# Patient Record
Sex: FEMALE | Race: BLACK OR AFRICAN AMERICAN | NOT HISPANIC OR LATINO | URBAN - METROPOLITAN AREA
[De-identification: names, ages, dates, MRNs, and addresses within clinical notes are randomized per-mention and may not be internally consistent; named-entity substitution may affect disease eponyms.]

---

## 2023-10-25 ENCOUNTER — HOSPITAL ENCOUNTER (EMERGENCY)
Facility: HOSPITAL | Age: 48
Discharge: HOME | End: 2023-10-25
Attending: EMERGENCY MEDICINE
Payer: COMMERCIAL

## 2023-10-25 ENCOUNTER — APPOINTMENT (OUTPATIENT)
Dept: RADIOLOGY | Facility: HOSPITAL | Age: 48
End: 2023-10-25
Payer: COMMERCIAL

## 2023-10-25 VITALS
WEIGHT: 204 LBS | HEIGHT: 62 IN | RESPIRATION RATE: 18 BRPM | SYSTOLIC BLOOD PRESSURE: 134 MMHG | DIASTOLIC BLOOD PRESSURE: 83 MMHG | TEMPERATURE: 98.4 F | OXYGEN SATURATION: 100 % | HEART RATE: 71 BPM | BODY MASS INDEX: 37.54 KG/M2

## 2023-10-25 DIAGNOSIS — E11.9 TYPE 2 DIABETES MELLITUS WITHOUT COMPLICATION, WITHOUT LONG-TERM CURRENT USE OF INSULIN (MULTI): Primary | ICD-10-CM

## 2023-10-25 DIAGNOSIS — R42 LIGHTHEADEDNESS: ICD-10-CM

## 2023-10-25 PROBLEM — J34.3 NASAL TURBINATE HYPERTROPHY: Status: ACTIVE | Noted: 2023-10-25

## 2023-10-25 PROBLEM — E66.812 OBESITY, CLASS II, BMI 35-39.9: Status: ACTIVE | Noted: 2022-05-23

## 2023-10-25 PROBLEM — R09.81 CHRONIC NASAL CONGESTION: Status: ACTIVE | Noted: 2023-10-25

## 2023-10-25 PROBLEM — J35.8 TONSIL STONE: Status: ACTIVE | Noted: 2023-10-25

## 2023-10-25 PROBLEM — E66.9 OBESITY, CLASS II, BMI 35-39.9: Status: ACTIVE | Noted: 2022-05-23

## 2023-10-25 PROBLEM — R00.0 TACHYCARDIA: Status: ACTIVE | Noted: 2022-05-23

## 2023-10-25 PROBLEM — K21.9 ACID REFLUX: Status: ACTIVE | Noted: 2023-10-25

## 2023-10-25 LAB
ALBUMIN SERPL-MCNC: 4.4 G/DL (ref 3.5–5)
ALP BLD-CCNC: 148 U/L (ref 35–125)
ALT SERPL-CCNC: 25 U/L (ref 5–40)
AMPHETAMINES UR QL SCN>1000 NG/ML: NEGATIVE
ANION GAP SERPL CALC-SCNC: 11 MMOL/L
APPEARANCE UR: CLEAR
AST SERPL-CCNC: 18 U/L (ref 5–40)
B-OH-BUTYR+ACETOACET BLD-SCNC: 0.1 MMOL/L (ref 0.1–0.3)
BACTERIA #/AREA URNS AUTO: ABNORMAL /HPF
BARBITURATES UR QL SCN>300 NG/ML: NEGATIVE
BASE EXCESS BLDV CALC-SCNC: -6.3 MMOL/L (ref -2–3)
BASOPHILS # BLD AUTO: 0.05 X10*3/UL (ref 0–0.1)
BASOPHILS NFR BLD AUTO: 0.5 %
BENZODIAZ UR QL SCN>300 NG/ML: NEGATIVE
BILIRUB SERPL-MCNC: 0.3 MG/DL (ref 0.1–1.2)
BILIRUB UR STRIP.AUTO-MCNC: NEGATIVE MG/DL
BODY TEMPERATURE: 37 DEGREES CELSIUS
BUN SERPL-MCNC: 10 MG/DL (ref 8–25)
BZE UR QL SCN>300 NG/ML: NEGATIVE
CALCIUM SERPL-MCNC: 9.2 MG/DL (ref 8.5–10.4)
CANNABINOIDS UR QL SCN>50 NG/ML: NEGATIVE
CHLORIDE SERPL-SCNC: 100 MMOL/L (ref 97–107)
CO2 SERPL-SCNC: 23 MMOL/L (ref 24–31)
COLOR UR: COLORLESS
CREAT SERPL-MCNC: 0.8 MG/DL (ref 0.4–1.6)
EOSINOPHIL # BLD AUTO: 0.22 X10*3/UL (ref 0–0.7)
EOSINOPHIL NFR BLD AUTO: 2.2 %
ERYTHROCYTE [DISTWIDTH] IN BLOOD BY AUTOMATED COUNT: 12.2 % (ref 11.5–14.5)
ETHANOL SERPL-MCNC: <0.01 G/DL
FENTANYL+NORFENTANYL UR QL SCN: NEGATIVE
GFR SERPL CREATININE-BSD FRML MDRD: 90 ML/MIN/1.73M*2
GLUCOSE BLD MANUAL STRIP-MCNC: 254 MG/DL (ref 74–99)
GLUCOSE BLD MANUAL STRIP-MCNC: 375 MG/DL (ref 74–99)
GLUCOSE SERPL-MCNC: 382 MG/DL (ref 65–99)
GLUCOSE UR STRIP.AUTO-MCNC: ABNORMAL MG/DL
HCG UR QL IA.RAPID: NEGATIVE
HCO3 BLDV-SCNC: 19.7 MMOL/L (ref 22–26)
HCT VFR BLD AUTO: 39.5 % (ref 36–46)
HGB BLD-MCNC: 13 G/DL (ref 12–16)
IMM GRANULOCYTES # BLD AUTO: 0.03 X10*3/UL (ref 0–0.7)
IMM GRANULOCYTES NFR BLD AUTO: 0.3 % (ref 0–0.9)
INHALED O2 CONCENTRATION: 0 %
KETONES UR STRIP.AUTO-MCNC: NEGATIVE MG/DL
LEUKOCYTE ESTERASE UR QL STRIP.AUTO: NEGATIVE
LYMPHOCYTES # BLD AUTO: 3.85 X10*3/UL (ref 1.2–4.8)
LYMPHOCYTES NFR BLD AUTO: 38.4 %
MAGNESIUM SERPL-MCNC: 2 MG/DL (ref 1.6–3.1)
MCH RBC QN AUTO: 29.9 PG (ref 26–34)
MCHC RBC AUTO-ENTMCNC: 32.9 G/DL (ref 32–36)
MCV RBC AUTO: 91 FL (ref 80–100)
METHADONE UR QL SCN>300 NG/ML: NEGATIVE
MONOCYTES # BLD AUTO: 0.85 X10*3/UL (ref 0.1–1)
MONOCYTES NFR BLD AUTO: 8.5 %
NEUTROPHILS # BLD AUTO: 5.03 X10*3/UL (ref 1.2–7.7)
NEUTROPHILS NFR BLD AUTO: 50.1 %
NITRITE UR QL STRIP.AUTO: NEGATIVE
NRBC BLD-RTO: 0 /100 WBCS (ref 0–0)
OPIATES UR QL SCN>300 NG/ML: NEGATIVE
OXYCODONE UR QL: NEGATIVE
OXYHGB MFR BLDV: 47.7 % (ref 45–75)
PCO2 BLDV: 40 MM HG (ref 41–51)
PCP UR QL SCN>25 NG/ML: NEGATIVE
PH BLDV: 7.3 PH (ref 7.33–7.43)
PH UR STRIP.AUTO: 5 [PH]
PLATELET # BLD AUTO: 330 X10*3/UL (ref 150–450)
PMV BLD AUTO: 9.7 FL (ref 7.5–11.5)
PO2 BLDV: 31 MM HG (ref 35–45)
POTASSIUM SERPL-SCNC: 4.3 MMOL/L (ref 3.4–5.1)
PROT SERPL-MCNC: 7.1 G/DL (ref 5.9–7.9)
PROT UR STRIP.AUTO-MCNC: NEGATIVE MG/DL
RBC # BLD AUTO: 4.35 X10*6/UL (ref 4–5.2)
RBC # UR STRIP.AUTO: ABNORMAL /UL
RBC #/AREA URNS AUTO: >20 /HPF
SAO2 % BLDV: 48 % (ref 45–75)
SARS-COV-2 RNA RESP QL NAA+PROBE: NOT DETECTED
SODIUM SERPL-SCNC: 134 MMOL/L (ref 133–145)
SP GR UR STRIP.AUTO: 1.02
TEST COMMENT: ABNORMAL
TROPONIN T SERPL-MCNC: <6 NG/L
TROPONIN T SERPL-MCNC: <6 NG/L
UROBILINOGEN UR STRIP.AUTO-MCNC: NORMAL MG/DL
WBC # BLD AUTO: 10 X10*3/UL (ref 4.4–11.3)
WBC #/AREA URNS AUTO: ABNORMAL /HPF

## 2023-10-25 PROCEDURE — 82077 ASSAY SPEC XCP UR&BREATH IA: CPT | Performed by: EMERGENCY MEDICINE

## 2023-10-25 PROCEDURE — 36415 COLL VENOUS BLD VENIPUNCTURE: CPT | Performed by: EMERGENCY MEDICINE

## 2023-10-25 PROCEDURE — 82805 BLOOD GASES W/O2 SATURATION: CPT | Performed by: EMERGENCY MEDICINE

## 2023-10-25 PROCEDURE — 2500000004 HC RX 250 GENERAL PHARMACY W/ HCPCS (ALT 636 FOR OP/ED): Performed by: EMERGENCY MEDICINE

## 2023-10-25 PROCEDURE — 80307 DRUG TEST PRSMV CHEM ANLYZR: CPT | Performed by: EMERGENCY MEDICINE

## 2023-10-25 PROCEDURE — 81001 URINALYSIS AUTO W/SCOPE: CPT | Performed by: EMERGENCY MEDICINE

## 2023-10-25 PROCEDURE — 80053 COMPREHEN METABOLIC PANEL: CPT | Performed by: EMERGENCY MEDICINE

## 2023-10-25 PROCEDURE — 71045 X-RAY EXAM CHEST 1 VIEW: CPT | Mod: FY

## 2023-10-25 PROCEDURE — 99284 EMERGENCY DEPT VISIT MOD MDM: CPT | Performed by: EMERGENCY MEDICINE

## 2023-10-25 PROCEDURE — 84484 ASSAY OF TROPONIN QUANT: CPT | Performed by: EMERGENCY MEDICINE

## 2023-10-25 PROCEDURE — 83735 ASSAY OF MAGNESIUM: CPT | Performed by: EMERGENCY MEDICINE

## 2023-10-25 PROCEDURE — 87635 SARS-COV-2 COVID-19 AMP PRB: CPT | Performed by: EMERGENCY MEDICINE

## 2023-10-25 PROCEDURE — 70450 CT HEAD/BRAIN W/O DYE: CPT

## 2023-10-25 PROCEDURE — 99285 EMERGENCY DEPT VISIT HI MDM: CPT | Mod: 25 | Performed by: EMERGENCY MEDICINE

## 2023-10-25 PROCEDURE — 81025 URINE PREGNANCY TEST: CPT | Performed by: EMERGENCY MEDICINE

## 2023-10-25 PROCEDURE — 82010 KETONE BODYS QUAN: CPT | Performed by: EMERGENCY MEDICINE

## 2023-10-25 PROCEDURE — 85025 COMPLETE CBC W/AUTO DIFF WBC: CPT | Performed by: EMERGENCY MEDICINE

## 2023-10-25 PROCEDURE — 82947 ASSAY GLUCOSE BLOOD QUANT: CPT | Mod: 59

## 2023-10-25 PROCEDURE — 82947 ASSAY GLUCOSE BLOOD QUANT: CPT

## 2023-10-25 RX ORDER — METFORMIN HYDROCHLORIDE 500 MG/1
500 TABLET ORAL
Qty: 30 TABLET | Refills: 0 | Status: SHIPPED | OUTPATIENT
Start: 2023-10-25 | End: 2023-11-09

## 2023-10-25 RX ORDER — LORATADINE 10 MG/1
TABLET ORAL
COMMUNITY

## 2023-10-25 RX ORDER — PANTOPRAZOLE SODIUM 40 MG/1
40 TABLET, DELAYED RELEASE ORAL DAILY
COMMUNITY
Start: 2020-02-05

## 2023-10-25 RX ORDER — ACETAMINOPHEN 325 MG/1
650 TABLET ORAL ONCE
Status: DISCONTINUED | OUTPATIENT
Start: 2023-10-25 | End: 2023-10-26 | Stop reason: HOSPADM

## 2023-10-25 RX ADMIN — SODIUM CHLORIDE 1000 ML: 900 INJECTION, SOLUTION INTRAVENOUS at 19:12

## 2023-10-25 ASSESSMENT — PAIN - FUNCTIONAL ASSESSMENT: PAIN_FUNCTIONAL_ASSESSMENT: 0-10

## 2023-10-25 ASSESSMENT — LIFESTYLE VARIABLES
HAVE YOU EVER FELT YOU SHOULD CUT DOWN ON YOUR DRINKING: NO
REASON UNABLE TO ASSESS: NO
EVER FELT BAD OR GUILTY ABOUT YOUR DRINKING: NO
HAVE PEOPLE ANNOYED YOU BY CRITICIZING YOUR DRINKING: NO
EVER HAD A DRINK FIRST THING IN THE MORNING TO STEADY YOUR NERVES TO GET RID OF A HANGOVER: NO

## 2023-10-25 ASSESSMENT — COLUMBIA-SUICIDE SEVERITY RATING SCALE - C-SSRS
2. HAVE YOU ACTUALLY HAD ANY THOUGHTS OF KILLING YOURSELF?: NO
6. HAVE YOU EVER DONE ANYTHING, STARTED TO DO ANYTHING, OR PREPARED TO DO ANYTHING TO END YOUR LIFE?: NO
1. IN THE PAST MONTH, HAVE YOU WISHED YOU WERE DEAD OR WISHED YOU COULD GO TO SLEEP AND NOT WAKE UP?: NO

## 2023-10-25 ASSESSMENT — PAIN SCALES - GENERAL
PAINLEVEL_OUTOF10: 0 - NO PAIN

## 2023-10-25 NOTE — Clinical Note
Tracie Green was seen and treated in our emergency department on 10/25/2023.  She may return to work on 10/27/2023.       If you have any questions or concerns, please don't hesitate to call.      Lorenza Tarango MD

## 2023-10-25 NOTE — ED TRIAGE NOTES
Pt had sudden onset weakness and dizziness. Pt did not lose consciousness but was difficult to arouse per coworkers. Pt lethargic and weak upon ems arrival. Pt has hx type 2 diabetes.

## 2023-10-25 NOTE — ED PROVIDER NOTES
HPI   Chief Complaint   Patient presents with    Weakness, Gen    Fatigue       This is a 48-year-old female who presents emergency department because she felt lightheaded at work.  Patient states she fell like she was going to fall over.  Patient denies feeling like the room was spinning.  Patient was taken from work to the ER via ambulance.  Patient denies any past medical history.  Patient states she takes no daily medications.  Patient states that she does not have any chest pain, belly pain, or back pain.  Patient denies any recent illnesses.  Patient states she just feels weak right now.  Patient states the lightheadedness resolved.  Patient denies difficulty with speech.  Patient denies changes in her vision, or numbness or tingling in her extremities.           Please see HPI for pertinent positive and negative ROS. The remaining 10 point ROS negative.                  [place] Coma Scale Score: 15         NIH Stroke Scale: 0          Patient History   Past Medical History:   Diagnosis Date    Personal history of other specified conditions     History of heartburn     No past surgical history on file.  No family history on file.  Social History     Tobacco Use    Smoking status: Not on file    Smokeless tobacco: Not on file   Substance Use Topics    Alcohol use: Not on file    Drug use: Not on file       Physical Exam   ED Triage Vitals [10/25/23 1806]   Temp Heart Rate Resp BP   36.9 °C (98.4 °F) 86 16 151/83      SpO2 Temp src Heart Rate Source Patient Position   100 % -- Monitor --      BP Location FiO2 (%)     -- --       Physical Exam  GENERAL APPEARANCE: Awake and alert. No acute distress.   VITAL SIGNS: As per the nurses' triage record.  HEENT: Normocephalic, atraumatic. Extraocular muscles are intact. Conjunctiva are pink. Negative scleral icterus. Mucous membranes are moist. Tongue in the midline. Oropharynx clear, uvula midline.   NECK: Soft, nontender and supple, full gross ROM, no meningeal  "signs.  CHEST: Nontender to palpation. Clear to auscultation bilaterally. No rales, rhonchi, or wheezing. Symmetric rise and fall of chest wall.   HEART: Clear S1 and S2. Regular rate and rhythm. No murmurs appreciated on auscultation.  Strong and equal pulses in the extremities.  ABDOMEN: Soft, nontender, nondistended, positive bowel sounds, no palpable masses.  MUSCULOSKELETAL: The calves are nontender to palpation. Full gross active range of motion. Ambulating on own with no acute difficulties  NEUROLOGICAL: Awake, alert and oriented x 3. Motor power intact in the upper and lower extremities. Sensation is intact to light touch in the upper and lower extremities. Patient answering questions appropriately.  Cranial nerves II through XII grossly intact bilaterally.    IMMUNOLOGICAL: No lymphatic streaking noted  DERMATOLOGIC: Warm and dry without petechiae, rashes, or ecchymosis noted on visible skin.   PYSCH: Cooperative with appropriate mood and affect.  ED Course & MDM   ED Course as of 10/25/23 2227   Wed Oct 25, 2023   2217 Patient's repeat blood glucose via accucheck 250.  Patient did a trial and ambulation was able to walk well.  Patient does have an appoint with her primary care doctor tomorrow to check her \"A1c.\"  Patient told me this at bedside. [SC]      ED Course User Index  [SC] Chaya Nicholas PA-C         Diagnoses as of 10/25/23 2227   Type 2 diabetes mellitus without complication, without long-term current use of insulin (CMS/[facility])   Lightheadedness       Medical Decision Making  Parts of this chart have been completed using voice recognition software. Please excuse any errors of transcription.  My thought process and reason for plan has been formulated from the time that I saw the patient until the time of disposition and is not specific to one specific moment during their visit and furthermore my MDM encompasses this entire chart and not only this text box.      HPI: Detailed above.    Exam: A " medically appropriate exam performed, outlined above, given the known history and presentation.    History obtained from: Patient    EKG: See my supervising physicians EKG interpretation    Social Determinants of Health considered during this visit: Lives at home    Medications given during visit:  Medications   acetaminophen (Tylenol) tablet 650 mg (650 mg oral Not Given 10/25/23 1850)   sodium chloride 0.9 % bolus 1,000 mL (0 mL intravenous Stopped 10/25/23 1942)        Diagnostic/tests  Labs Reviewed   COMPREHENSIVE METABOLIC PANEL - Abnormal       Result Value    Glucose 382 (*)     Sodium 134      Potassium 4.3      Chloride 100      Bicarbonate 23 (*)     Urea Nitrogen 10      Creatinine 0.80      eGFR 90      Calcium 9.2      Albumin 4.4      Alkaline Phosphatase 148 (*)     Total Protein 7.1      AST 18      Bilirubin, Total 0.3      ALT 25      Anion Gap 11     URINALYSIS WITH REFLEX MICROSCOPIC - Abnormal    Color, Urine Colorless (*)     Appearance, Urine Clear      Specific Gravity, Urine 1.016      pH, Urine 5.0      Protein, Urine NEGATIVE      Glucose, Urine OVER (4+) (*)     Blood, Urine OVER (3+) (*)     Ketones, Urine NEGATIVE      Bilirubin, Urine NEGATIVE      Urobilinogen, Urine Normal      Nitrite, Urine NEGATIVE      Leukocyte Esterase, Urine NEGATIVE     BLOOD GAS VENOUS - Abnormal    POCT pH, Venous 7.30 (*)     POCT pCO2, Venous 40 (*)     POCT pO2, Venous 31 (*)     POCT SO2, Venous 48      POCT Oxy Hemoglobin, Venous 47.7      POCT Base Excess, Venous -6.3 (*)     POCT HCO3 Calculated, Venous 19.7 (*)     Patient Temperature 37.0      FiO2 0      Test Comment [id number]     MICROSCOPIC ONLY, URINE - Abnormal    WBC, Urine 1-5      RBC, Urine >20 (*)     Bacteria, Urine 1+ (*)    POCT GLUCOSE - Abnormal    POCT Glucose 375 (*)    POCT GLUCOSE - Abnormal    POCT Glucose 254 (*)    MAGNESIUM - Normal    Magnesium 2.00     SERIAL TROPONIN, INITIAL ([facility]) - Normal    Troponin T, High Sensitivity  <6     SERIAL TROPONIN,  2 HOUR ([facility]) - Normal    Troponin T, High Sensitivity <6     HCG, URINE, QUALITATIVE - Normal    HCG, Urine NEGATIVE     DRUG SCREEN,URINE - Normal    Amphetamine Screen, Urine Negative      Barbiturate Screen, Urine Negative      Benzodiazepines Screen, Urine Negative      Cannabinoid Screen, Urine Negative      Cocaine Metabolite Screen, Urine Negative      Fentanyl Screen, Urine Negative      Methadone Screen, Urine Negative      Opiate Screen, Urine Negative      Oxycodone Screen, Urine Negative      PCP Screen, Urine Negative      Narrative:     These toxicological screening tests provide unconfirmed qualitative measurements to aid in treatment and diagnosis in cases of drug use or overdose. This test is used only for medical purposes. A positive result does not indicate or measure intoxication. For specific test performance or pathologist consultation, please contact the Laboratory.    The following threshold concentrations are used for these analyses.Values at or above the threshold concentration are reported as positive. Values below the threshold are reported as negative.    Drug /Screening Threshold                                                                                                 THC/CANNABINOIDS................50 ng/ml  METHADONE......................300 ng/ml  COCAINE METABOLITES............300 ng/ml  BENZODIAZEPINE.................300 ng/ml  PCP.............................25 ng/ml  OPIATE.........................300 ng/ml  AMPHETAMINE/ECSTASY...........1000 ng/ml  BARBITURATE....................200 ng/ml  OXYCODONE......................100 ng/ml  FENTANYL.........................5 ng/ml       ALCOHOL - Normal    Alcohol <0.010     SARS-COV-2 PCR, SYMPTOMATIC - Normal    Coronavirus 2019, PCR Not Detected      Narrative:     This assay has received FDA Emergency Use Authorization (EUA) and is only authorized for the duration of time that circumstances exist to  justify the authorization of the emergency use of in vitro diagnostic tests for the detection of SARS-CoV-2 virus and/or diagnosis of COVID-19 infection under section 564(b)(1) of the Act, 21 U.S.C. 360bbb-3(b)(1). This assay is an in vitro diagnostic nucleic acid amplification test for the qualitative detection of SARS-CoV-2 from nasopharyngeal specimens and has been validated for use at [facility]. Negative results do not preclude COVID-19 infections and should not be used as the sole basis for diagnosis, treatment, or other management decisions.     BETA HYDROXYBUTYRATE - Normal    Beta-Hydroxybutyrate 0.10      Narrative:     Values exceeding 0.27 mmol/L indicate ketosis.   CBC WITH AUTO DIFFERENTIAL    WBC 10.0      nRBC 0.0      RBC 4.35      Hemoglobin 13.0      Hematocrit 39.5      MCV 91      MCH 29.9      MCHC 32.9      RDW 12.2      Platelets 330      MPV 9.7      Neutrophils % 50.1      Immature Granulocytes %, Automated 0.3      Lymphocytes % 38.4      Monocytes % 8.5      Eosinophils % 2.2      Basophils % 0.5      Neutrophils Absolute 5.03      Immature Granulocytes Absolute, Automated 0.03      Lymphocytes Absolute 3.85      Monocytes Absolute 0.85      Eosinophils Absolute 0.22      Basophils Absolute 0.05     TROPONIN T SERIES, HIGH SENSITIVITY (0, 2 HR, 6 HR)    Narrative:     The following orders were created for panel order Troponin T Series, High Sensitivity (0, 2HR, 6HR).  Procedure                               Abnormality         Status                     ---------                               -----------         ------                     Serial Troponin, Initial...[[id number]]  Normal              Final result               Serial Troponin, 2 Hour ...[[id number]]  Normal              Final result               Serial Troponin, 6 Hour ...[[id number]]                                                   Please view results for these tests on the individual orders.    SERIAL TROPONIN, 6 HOUR ([facility])   POCT FINGERSTICK GLUCOSE   POCT GLUCOSE METER      XR chest 1 view   Final Result   Normal portable chest.        Signed by: Brooklynn Edwards 10/25/2023 7:41 PM   Dictation workstation:   [id number]      CT head wo IV contrast   Final Result   Normal unenhanced CT brain.        Signed by: Brooklynn Edwards 10/25/2023 7:20 PM   Dictation workstation:   [id number]           Considerations/further MDM:  Patient was seen in conjucntion with my supervising physician,  Dr. Tarango. Please refer to her note.    I estimate there is LOW risk for ACUTE CORONARY SYNDROME, MALIGNANT DYSRHYTHMIA, PNEUMOTHORAX, PNEUMONIA, PULMONARY EMBOLISM, AORTIC DISSECTION, SEPSIS, SUBARACHNOID HEMORRHAGE, SUBDURAL HEMATOMA, INTRACRANIAL HEMORRHAGE, STROKE, MENINGITIS, URINARY TRACT INFECTION, DKA, HHS, or ELECTROLYTE DISORDER, thus I consider the discharge disposition reasonable.  Laboratory evaluation shows the patient does have diabetes.  She is not in DKA or HHS currently based on laboratory evaluation.  Patient was educated that she must follow-up with her primary care doctor to start controlling her diabetes.  We will send her prescription of metformin 500 mg p.o. twice daily.   We have discussed the diagnosis and risks, and we agree with discharging home to follow-up with their primary doctor or specialist as directed in your discharge instructions. We also discussed returning to the Emergency Department immediately if new or worsening symptoms occur. We have discussed the symptoms which are most concerning (e.g. weakness, visual disturbances, difficult with speech, shortness of breath, vomiting, fever) that necessitate immediate return.    Verbalized understanding to discharge instructions.  Patient was discharged in stable condition.  Procedure  Procedures     Chaya Nicholas PA-C  10/25/23 1322

## 2023-10-25 NOTE — PROGRESS NOTES
Attestation note/supervisory note for USMAN Nicholas      The patient is a 48-year-old female presenting to the emergency department by EMS from work.  The patient reportedly started having some lightheadedness and just did not feel well shortly prior to coming to the emergency room.  She states that she just has been feeling very fatigued and having some generalized malaise.  She states that she started feeling confused about an hour or 2 prior to arrival.  She states that coworkers called EMS.  She denies any headache or visual changes this time but she did have a mild headache earlier in the day.  No focal weakness or numbness.  No chest pain or shortness of breath.  No abdominal pain.  No nausea or vomiting.  No diarrhea or constipation but no urinary complaints.  All pertinent positives and negatives are recorded above.  All other systems reviewed and otherwise negative.  Vital signs with mild hypertension but otherwise within normal limits.  Sickle exam with a well-nourished well-developed female with obesity but no evidence of acute distress.  HEENT exam within normal limits.  She has no evidence of airway compromise or respiratory distress.  Abdominal exam is benign.  She has no gross motor, neurologic or vascular deficits on exam.      EKG with sinus rhythm at 80 bpm, normal axis, normal voltage, normal ST segment, normal T waves      IV fluids and oral acetaminophen ordered.      Diagnostic labs with glycosuria, hematuria, hyperglycemia but no evidence of DKA, but otherwise unremarkable.      Initial Troponin T <6.  Repeat trop T <6. Delta trop t 0      COVID-19 testing negative      Chest x-ray  IMPRESSION:  Normal portable chest.      CT head  IMPRESSION:  Normal unenhanced CT brain.      Repeat Accu-Chek was 250.      The patient reportedly does have an appoint with her primary care physician tomorrow to have her hemoglobin A1c checked as they are following her for possible diabetes.  She does not have  any evidence of overt DKA on diagnostic labs.  She does not have any acute process on CT imaging.  No evidence of mass effect, no cranial hemorrhage or other acute process.  No evidence of pneumonia or pneumothorax on chest x-ray.  The patient does not have any neurologic deficits on exam.  She was able to complete a trial of ambulation in the emergency room prior to release.  She states that she did feel better.      The patient was released in good condition.  She was instructed to follow-up with her primary care physician tomorrow as previously scheduled for further management of her diabetes.  She will return to the emergency department sooner with worsening of symptoms or onset of new symptoms.  Rx given for metformin.        I personally saw the patient and performed a substantive portion of the visit including all aspects of the medical decision making.      I reviewed the results of the diagnostic labs and diagnostic imaging.  Formal radiology reading was completed by the radiologist    Lorenza Tarango MD

## 2023-10-26 ENCOUNTER — HOSPITAL ENCOUNTER (OUTPATIENT)
Dept: CARDIOLOGY | Facility: HOSPITAL | Age: 48
Discharge: HOME | End: 2023-10-26
Payer: COMMERCIAL

## 2023-10-26 LAB
ATRIAL RATE: 80 BPM
P AXIS: 74 DEGREES
P OFFSET: 200 MS
P ONSET: 152 MS
PR INTERVAL: 128 MS
Q ONSET: 216 MS
QRS COUNT: 14 BEATS
QRS DURATION: 90 MS
QT INTERVAL: 390 MS
QTC CALCULATION(BAZETT): 449 MS
QTC FREDERICIA: 429 MS
R AXIS: 66 DEGREES
T AXIS: 73 DEGREES
T OFFSET: 411 MS
VENTRICULAR RATE: 80 BPM

## 2023-10-26 PROCEDURE — 93005 ELECTROCARDIOGRAM TRACING: CPT

## 2023-10-26 NOTE — DISCHARGE INSTRUCTIONS
Follow-up with your primary care physician tomorrow as previously scheduled.      Return to the emergency department sooner with worsening of symptoms or onset of new symptoms

## 2024-06-28 ENCOUNTER — HOSPITAL ENCOUNTER (OUTPATIENT)
Dept: RADIOLOGY | Facility: HOSPITAL | Age: 49
Discharge: HOME | End: 2024-06-28
Payer: COMMERCIAL

## 2024-06-28 VITALS — WEIGHT: 208 LBS | HEIGHT: 62 IN | BODY MASS INDEX: 38.28 KG/M2

## 2024-06-28 DIAGNOSIS — Z12.31 SCREENING MAMMOGRAM FOR BREAST CANCER: ICD-10-CM

## 2024-06-28 PROCEDURE — 77067 SCR MAMMO BI INCL CAD: CPT

## 2024-10-02 ENCOUNTER — APPOINTMENT (OUTPATIENT)
Dept: GASTROENTEROLOGY | Facility: CLINIC | Age: 49
End: 2024-10-02
Payer: COMMERCIAL

## 2024-10-02 VITALS — HEIGHT: 62 IN | OXYGEN SATURATION: 98 % | BODY MASS INDEX: 39.42 KG/M2 | WEIGHT: 214.2 LBS | HEART RATE: 96 BPM

## 2024-10-02 DIAGNOSIS — R12 HEARTBURN: ICD-10-CM

## 2024-10-02 DIAGNOSIS — Z12.11 COLON CANCER SCREENING: ICD-10-CM

## 2024-10-02 DIAGNOSIS — K59.01 SLOW TRANSIT CONSTIPATION: Primary | ICD-10-CM

## 2024-10-02 DIAGNOSIS — K21.9 GASTROESOPHAGEAL REFLUX DISEASE WITHOUT ESOPHAGITIS: ICD-10-CM

## 2024-10-02 PROCEDURE — 99204 OFFICE O/P NEW MOD 45 MIN: CPT | Performed by: INTERNAL MEDICINE

## 2024-10-02 PROCEDURE — 1036F TOBACCO NON-USER: CPT | Performed by: INTERNAL MEDICINE

## 2024-10-02 PROCEDURE — 3008F BODY MASS INDEX DOCD: CPT | Performed by: INTERNAL MEDICINE

## 2024-10-02 RX ORDER — GLIMEPIRIDE 1 MG/1
1 TABLET ORAL
COMMUNITY

## 2024-10-02 RX ORDER — SEMAGLUTIDE 0.68 MG/ML
INJECTION, SOLUTION SUBCUTANEOUS
COMMUNITY
End: 2024-10-02 | Stop reason: ALTCHOICE

## 2024-10-02 NOTE — PROGRESS NOTES
Primary Care Provider: No primary care provider on file.  Referring Provider: No ref. provider found  My final recommendations will be communicated back to the referring physician and/or primary care provider via shared medical records or via US mail.    Chief Complaint (Reason for visit):   Tracie Green is a 49 y.o. female who presents for chronic constipation    ASSESSMENT AND PLAN     Assessment & Plan  Slow transit constipation  Patient's constipation is likely multifactorial from IBS, diabetic autonomic neuropathy with superimposed GLP-1 analog use    - I will start the patient on Linzess and assess response  -Colonoscopy for screening  Orders:    linaCLOtide (Linzess) 72 mcg capsule; Take 1 capsule (72 mcg) by mouth once daily in the morning. Take before meals. Do not crush or chew.    Follow Up In Gastroenterology; Future    Colon cancer screening    Orders:    Colonoscopy Diagnostic; Future    Heartburn  Patient has several risk factors for heartburn.  She also has halitosis.  This is despite taking pantoprazole 40 mg once a day    Differential diagnoses include gastroparesis, HH, GERD    -Recommend an EGD with biopsies  -After EGD will likely change her PPI to Nexium 40 mg twice a day. I may try Reglan or Motegrity.  Orders:    Follow Up In Gastroenterology; Future    Esophagogastroduodenoscopy (EGD); Future    Gastroesophageal reflux disease without esophagitis  Patient has several risk factors for heartburn.  She also has halitosis.  This is despite taking pantoprazole 40 mg once a day    Differential diagnoses include gastroparesis, HH, GERD    -Recommend an EGD with biopsies  -After EGD will likely change her PPI to Nexium 40 mg twice a day. I may try Reglan or Motegrity.    Orders:    Esophagogastroduodenoscopy (EGD); Future      I discussed the risks, benefits and alternative(s) of the procedure(s) with the patient. Pt verbalizes understanding and is willing to proceed. All questions were  "answered.    Kamila Coley MD, MS  10/2/2024    SUBJECTIVE   HPI: History obtained from patient    Patient comes to see me for several complaints    A) constipation  At baseline she has 1 BM per week  She takes fiber supplements every day which partially helps - has 2-3 BM per week  She has been this way even before she was diagnosed with diabetes or she was on Ozempic (she was diagnosed with diabetes about 8 months ago)    B) she also complains of heartburn and bad breath  She has been to the dentist and dental pathology has been ruled out  She had an EGD several years ago and was told that she has hiatal hernia.  She is on Protonix 40 mg/day which partially helps    RF  + on Ozempic   + DM  + HH    REDFLAGS  Pt denies any blood in stool, black stools  Pt denies unintentional weight loss, fever, chills, night sweats  Pt denies family history of GI cancer    Past Medical History:   Diagnosis Date    Personal history of other specified conditions     History of heartburn       No past surgical history on file.    Current Outpatient Medications   Medication Sig Dispense Refill    glimepiride (Amaryl) 1 mg tablet Take 1 tablet (1 mg) by mouth once daily in the morning. Take before meals.      metFORMIN (Glucophage) 500 mg tablet Take 1 tablet (500 mg) by mouth 2 times a day with meals for 15 days. 30 tablet 0    pantoprazole (ProtoNix) 40 mg EC tablet Take 1 tablet (40 mg) by mouth once daily. 30 minutes before dinner, for 3 months       No current facility-administered medications for this visit.       Allergies   Allergen Reactions    Penicillins Anaphylaxis     OBJECTIVE   PHYSICAL EXAM:  Pulse 96   Ht 1.575 m (5' 2\")   Wt 97.2 kg (214 lb 3.2 oz)   SpO2 98%   BMI 39.18 kg/m²      LABS/IMAGING/SCOPES  Lab Results   Component Value Date    WBC 10.0 10/25/2023    HGB 13.0 10/25/2023    HCT 39.5 10/25/2023    MCV 91 10/25/2023     10/25/2023     Lab Results   Component Value Date    GLUCOSE 382 (H) " 10/25/2023    CALCIUM 9.2 10/25/2023     10/25/2023    K 4.3 10/25/2023    CO2 23 (L) 10/25/2023     10/25/2023    BUN 10 10/25/2023    CREATININE 0.80 10/25/2023     Lab Results   Component Value Date    ALT 25 10/25/2023    AST 18 10/25/2023    ALKPHOS 148 (H) 10/25/2023    BILITOT 0.3 10/25/2023       === 10/25/23 ===    CT HEAD WO IV CONTRAST    - Impression -  Normal unenhanced CT brain.    Signed by: Brooklynn Edwards 10/25/2023 7:20 PM  Dictation workstation:   [id number]    Kamila Coley MD, MS  10/2/2024

## 2024-10-02 NOTE — LETTER
October 2, 2024     Aracelis Ocampo MD  [address]  [address]  [facility] [address]    Patient: Tracie Green   YOB: 1975   Date of Visit: 10/2/2024       Dear Dr. Aracelis Ocampo MD:    Thank you for referring Tracie Green to me for evaluation. Below are my notes for this consultation.  If you have questions, please do not hesitate to call me. I look forward to following your patient along with you.       Sincerely,     Kamila Coley MD, MS      CC: No Recipients  ______________________________________________________________________________________    Primary Care Provider: No primary care provider on file.  Referring Provider: No ref. provider found  My final recommendations will be communicated back to the referring physician and/or primary care provider via shared medical records or via US mail.    Chief Complaint (Reason for visit):   Tracie Green is a 49 y.o. female who presents for chronic constipation    ASSESSMENT AND PLAN     Assessment & Plan  Slow transit constipation  Patient's constipation is likely multifactorial from IBS, diabetic autonomic neuropathy with superimposed GLP-1 analog use    - I will start the patient on Linzess and assess response  -Colonoscopy for screening  Orders:  •  linaCLOtide (Linzess) 72 mcg capsule; Take 1 capsule (72 mcg) by mouth once daily in the morning. Take before meals. Do not crush or chew.  •  Follow Up In Gastroenterology; Future    Colon cancer screening    Orders:  •  Colonoscopy Diagnostic; Future    Heartburn  Patient has several risk factors for heartburn.  She also has halitosis.  This is despite taking pantoprazole 40 mg once a day    Differential diagnoses include gastroparesis, HH, GERD    -Recommend an EGD with biopsies  -After EGD will likely change her PPI to Nexium 40 mg twice a day. I may try Reglan or Motegrity.  Orders:  •  Follow Up In Gastroenterology; Future  •  Esophagogastroduodenoscopy (EGD);  Future    Gastroesophageal reflux disease without esophagitis  Patient has several risk factors for heartburn.  She also has halitosis.  This is despite taking pantoprazole 40 mg once a day    Differential diagnoses include gastroparesis, HH, GERD    -Recommend an EGD with biopsies  -After EGD will likely change her PPI to Nexium 40 mg twice a day. I may try Reglan or Motegrity.    Orders:  •  Esophagogastroduodenoscopy (EGD); Future      I discussed the risks, benefits and alternative(s) of the procedure(s) with the patient. Pt verbalizes understanding and is willing to proceed. All questions were answered.    Kamila Coley MD, MS  10/2/2024    SUBJECTIVE   HPI: History obtained from patient    Patient comes to see me for several complaints    A) constipation  At baseline she has 1 BM per week  She takes fiber supplements every day which partially helps - has 2-3 BM per week  She has been this way even before she was diagnosed with diabetes or she was on Ozempic (she was diagnosed with diabetes about 8 months ago)    B) she also complains of heartburn and bad breath  She has been to the dentist and dental pathology has been ruled out  She had an EGD several years ago and was told that she has hiatal hernia.  She is on Protonix 40 mg/day which partially helps    RF  + on Ozempic   + DM  + HH    REDFLAGS  Pt denies any blood in stool, black stools  Pt denies unintentional weight loss, fever, chills, night sweats  Pt denies family history of GI cancer    Past Medical History:   Diagnosis Date   • Personal history of other specified conditions     History of heartburn       No past surgical history on file.    Current Outpatient Medications   Medication Sig Dispense Refill   • glimepiride (Amaryl) 1 mg tablet Take 1 tablet (1 mg) by mouth once daily in the morning. Take before meals.     • metFORMIN (Glucophage) 500 mg tablet Take 1 tablet (500 mg) by mouth 2 times a day with meals for 15 days. 30 tablet 0   •  "pantoprazole (ProtoNix) 40 mg EC tablet Take 1 tablet (40 mg) by mouth once daily. 30 minutes before dinner, for 3 months       No current facility-administered medications for this visit.       Allergies   Allergen Reactions   • Penicillins Anaphylaxis     OBJECTIVE   PHYSICAL EXAM:  Pulse 96   Ht 1.575 m (5' 2\")   Wt 97.2 kg (214 lb 3.2 oz)   SpO2 98%   BMI 39.18 kg/m²      LABS/IMAGING/SCOPES  Lab Results   Component Value Date    WBC 10.0 10/25/2023    HGB 13.0 10/25/2023    HCT 39.5 10/25/2023    MCV 91 10/25/2023     10/25/2023     Lab Results   Component Value Date    GLUCOSE 382 (H) 10/25/2023    CALCIUM 9.2 10/25/2023     10/25/2023    K 4.3 10/25/2023    CO2 23 (L) 10/25/2023     10/25/2023    BUN 10 10/25/2023    CREATININE 0.80 10/25/2023     Lab Results   Component Value Date    ALT 25 10/25/2023    AST 18 10/25/2023    ALKPHOS 148 (H) 10/25/2023    BILITOT 0.3 10/25/2023       === 10/25/23 ===    CT HEAD WO IV CONTRAST    - Impression -  Normal unenhanced CT brain.    Signed by: Brooklynn Edwards 10/25/2023 7:20 PM  Dictation workstation:   [id number]    Kamila Coley MD, MS  10/2/2024  "

## 2024-10-02 NOTE — ASSESSMENT & PLAN NOTE
Patient has several risk factors for heartburn.  She also has halitosis.  This is despite taking pantoprazole 40 mg once a day    Differential diagnoses include gastroparesis, HH, GERD    -Recommend an EGD with biopsies  -After EGD will likely change her PPI to Nexium 40 mg twice a day. I may try Reglan or Motegrity.    Orders:    Esophagogastroduodenoscopy (EGD); Future

## 2024-10-03 RX ORDER — SODIUM PICOSULFATE, MAGNESIUM OXIDE, AND ANHYDROUS CITRIC ACID 12; 3.5; 1 G/175ML; G/175ML; MG/175ML
1 LIQUID ORAL SEE ADMIN INSTRUCTIONS
Qty: 350 ML | Refills: 0 | Status: SHIPPED | OUTPATIENT
Start: 2024-10-03

## 2024-10-30 ENCOUNTER — APPOINTMENT (OUTPATIENT)
Dept: GASTROENTEROLOGY | Facility: EXTERNAL LOCATION | Age: 49
End: 2024-10-30
Payer: COMMERCIAL

## 2025-01-08 DIAGNOSIS — K59.01 SLOW TRANSIT CONSTIPATION: ICD-10-CM

## 2025-01-09 ENCOUNTER — APPOINTMENT (OUTPATIENT)
Dept: GASTROENTEROLOGY | Facility: EXTERNAL LOCATION | Age: 50
End: 2025-01-09
Payer: COMMERCIAL

## 2025-02-17 ENCOUNTER — APPOINTMENT (OUTPATIENT)
Dept: GASTROENTEROLOGY | Facility: EXTERNAL LOCATION | Age: 50
End: 2025-02-17
Payer: COMMERCIAL

## 2025-04-25 DIAGNOSIS — Z12.11 COLON CANCER SCREENING: ICD-10-CM

## 2025-04-25 RX ORDER — SODIUM PICOSULFATE, MAGNESIUM OXIDE, AND ANHYDROUS CITRIC ACID 12; 3.5; 1 G/175ML; G/175ML; MG/175ML
1 LIQUID ORAL SEE ADMIN INSTRUCTIONS
Qty: 350 ML | Refills: 0 | Status: SHIPPED | OUTPATIENT
Start: 2025-04-25

## 2025-05-14 ENCOUNTER — OFFICE VISIT (OUTPATIENT)
Dept: GASTROENTEROLOGY | Facility: EXTERNAL LOCATION | Age: 50
End: 2025-05-14
Payer: COMMERCIAL

## 2025-05-14 DIAGNOSIS — D12.4 BENIGN NEOPLASM OF DESCENDING COLON: ICD-10-CM

## 2025-05-14 DIAGNOSIS — K59.01 SLOW TRANSIT CONSTIPATION: ICD-10-CM

## 2025-05-14 DIAGNOSIS — K21.9 GERD WITHOUT ESOPHAGITIS: ICD-10-CM

## 2025-05-14 DIAGNOSIS — Z12.11 COLON CANCER SCREENING: ICD-10-CM

## 2025-05-14 DIAGNOSIS — Z12.11 SPECIAL SCREENING FOR MALIGNANT NEOPLASMS, COLON: Primary | ICD-10-CM

## 2025-05-14 DIAGNOSIS — K44.9 DIAPHRAGMATIC HERNIA WITHOUT OBSTRUCTION OR GANGRENE: ICD-10-CM

## 2025-05-14 DIAGNOSIS — K21.9 GASTRIC REFLUX: ICD-10-CM

## 2025-05-14 DIAGNOSIS — K22.89 OTHER SPECIFIED DISEASE OF ESOPHAGUS: ICD-10-CM

## 2025-05-14 PROCEDURE — 43239 EGD BIOPSY SINGLE/MULTIPLE: CPT | Performed by: INTERNAL MEDICINE

## 2025-05-14 PROCEDURE — 45385 COLONOSCOPY W/LESION REMOVAL: CPT | Performed by: INTERNAL MEDICINE

## 2025-05-14 RX ORDER — OMEPRAZOLE 40 MG/1
40 CAPSULE, DELAYED RELEASE ORAL
Qty: 180 CAPSULE | Refills: 1 | Status: SHIPPED | OUTPATIENT
Start: 2025-05-14 | End: 2025-05-14 | Stop reason: WASHOUT

## 2025-05-14 NOTE — PROGRESS NOTES
Procedure note is completed in [facility]. Report is scanned under 'Media' tab in Epic.    - increase linzess to 145 mcg  - take omeprazole regularly    Kamila Coley MD, MS

## 2025-05-15 ENCOUNTER — APPOINTMENT (OUTPATIENT)
Dept: GASTROENTEROLOGY | Facility: EXTERNAL LOCATION | Age: 50
End: 2025-05-15
Payer: COMMERCIAL

## 2025-07-14 ENCOUNTER — APPOINTMENT (OUTPATIENT)
Dept: GASTROENTEROLOGY | Facility: CLINIC | Age: 50
End: 2025-07-14
Payer: COMMERCIAL

## 2025-08-22 RX ORDER — PREDNISOLONE ACETATE 10 MG/ML
1 SUSPENSION/ DROPS OPHTHALMIC 2 TIMES DAILY
COMMUNITY
Start: 2024-10-08

## 2025-08-22 RX ORDER — LINACLOTIDE 72 UG/1
1 CAPSULE, GELATIN COATED ORAL
COMMUNITY
Start: 2025-06-19 | End: 2025-08-25

## 2025-08-22 RX ORDER — NEOMYCIN SULFATE, POLYMYXIN B SULFATE, AND DEXAMETHASONE 3.5; 10000; 1 MG/G; [USP'U]/G; MG/G
1 OINTMENT OPHTHALMIC 3 TIMES DAILY
COMMUNITY
Start: 2024-10-08

## 2025-08-22 RX ORDER — SEMAGLUTIDE 0.68 MG/ML
INJECTION, SOLUTION SUBCUTANEOUS
COMMUNITY
Start: 2024-11-01

## 2025-08-22 RX ORDER — SEMAGLUTIDE 1.34 MG/ML
INJECTION, SOLUTION SUBCUTANEOUS
COMMUNITY
Start: 2025-06-17

## 2025-08-25 ENCOUNTER — APPOINTMENT (OUTPATIENT)
Dept: GASTROENTEROLOGY | Facility: CLINIC | Age: 50
End: 2025-08-25
Payer: COMMERCIAL

## 2025-08-25 VITALS — HEIGHT: 62 IN | HEART RATE: 76 BPM | BODY MASS INDEX: 34.23 KG/M2 | WEIGHT: 186 LBS

## 2025-08-25 DIAGNOSIS — R12 HEARTBURN: ICD-10-CM

## 2025-08-25 DIAGNOSIS — K59.01 SLOW TRANSIT CONSTIPATION: Primary | ICD-10-CM

## 2025-08-25 DIAGNOSIS — R14.0 BLOATING: ICD-10-CM

## 2025-08-25 PROCEDURE — 1036F TOBACCO NON-USER: CPT | Performed by: INTERNAL MEDICINE

## 2025-08-25 PROCEDURE — 99214 OFFICE O/P EST MOD 30 MIN: CPT | Performed by: INTERNAL MEDICINE

## 2025-08-25 PROCEDURE — 3008F BODY MASS INDEX DOCD: CPT | Performed by: INTERNAL MEDICINE

## 2025-12-31 ENCOUNTER — APPOINTMENT (OUTPATIENT)
Dept: ALLERGY | Facility: CLINIC | Age: 50
End: 2025-12-31
Payer: COMMERCIAL